# Patient Record
(demographics unavailable — no encounter records)

---

## 2024-11-25 NOTE — PHYSICAL EXAM
[No Acute Distress] : no acute distress [Well Nourished] : well nourished [Well Developed] : well developed [Well-Appearing] : well-appearing [Normal Sclera/Conjunctiva] : normal sclera/conjunctiva [PERRL] : pupils equal round and reactive to light [EOMI] : extraocular movements intact [Normal Outer Ear/Nose] : the outer ears and nose were normal in appearance [Normal Oropharynx] : the oropharynx was normal [No JVD] : no jugular venous distention [No Lymphadenopathy] : no lymphadenopathy [Supple] : supple [Thyroid Normal, No Nodules] : the thyroid was normal and there were no nodules present [No Respiratory Distress] : no respiratory distress  [No Accessory Muscle Use] : no accessory muscle use [Clear to Auscultation] : lungs were clear to auscultation bilaterally [Normal Rate] : normal rate  [Regular Rhythm] : with a regular rhythm [Normal S1, S2] : normal S1 and S2 [No Murmur] : no murmur heard [No Carotid Bruits] : no carotid bruits [No Abdominal Bruit] : a ~M bruit was not heard ~T in the abdomen [No Varicosities] : no varicosities [Pedal Pulses Present] : the pedal pulses are present [No Edema] : there was no peripheral edema [No Palpable Aorta] : no palpable aorta [No Extremity Clubbing/Cyanosis] : no extremity clubbing/cyanosis [Soft] : abdomen soft [Non Tender] : non-tender [Non-distended] : non-distended [No Masses] : no abdominal mass palpated [No HSM] : no HSM [Normal Bowel Sounds] : normal bowel sounds [Normal Posterior Cervical Nodes] : no posterior cervical lymphadenopathy [Normal Anterior Cervical Nodes] : no anterior cervical lymphadenopathy [No CVA Tenderness] : no CVA  tenderness [No Spinal Tenderness] : no spinal tenderness [Scoliosis] : scoliosis [No Joint Swelling] : no joint swelling [Grossly Normal Strength/Tone] : grossly normal strength/tone [No Rash] : no rash [Coordination Grossly Intact] : coordination grossly intact [No Focal Deficits] : no focal deficits [Deep Tendon Reflexes (DTR)] : deep tendon reflexes were 2+ and symmetric [Normal Affect] : the affect was normal [Normal Insight/Judgement] : insight and judgment were intact [de-identified] : IN DISTRESS- 9/10 pain on right side, sitting hunched over [de-identified] : no CVAT; does have mod scoiosis; poor ROM, nontender to palp [de-identified] : Pt is hunched over to the right holding her side [de-identified] : diff walking, using cane, unable to get up onto table

## 2024-11-25 NOTE — HISTORY OF PRESENT ILLNESS
[FreeTextEntry8] : Pt was here for annual but is in great pain and we switched to acute visit: Cares for mom who is 100 yo. While helping her off toilet, pt pulled something on right side a few dd ago, now in agony. She thinks it's the "latissimus" mm. She is having trouble walking. Not driving. Some radiation down right side into leg. ?dysuria, pain sort of starts at flank. No fever/chills, n/v. No blood in urine. She vomited from the pain. Does not take meds eg tylenol, advil. Has had this before but this is much worse. She is always in wierd positions helping her mom who's in [facility] home. A few wks ago spent 4 dd in [facility] for vertigo- many tests done, all neg per pt and referred to neuro and cardiol. Will request records. Last wk she saw Cardiol at [facility] Dr Olivo, hydralazine was stopped and he restarted it- altho no mes taken today Mammo/Dexa due Flu shot: will do today   h/o hip fx, also left knee repl. Dexa nl tho and not on meds.

## 2024-11-25 NOTE — ASSESSMENT
[FreeTextEntry1] : u/a sm leuk  Imp: pt w acute right side/back pain ?beginning in flank area although she feels it's a muscle strain from carrying her mother.  Will check Abdo US, urine cx Flu shot now Get records from recent hosp for vertigo.  Monitor BP at home, she will send readings, she didn't take her meds today..  Advised tylenol, heat to area. Refer to Ortho, she has scoliosis too.  ENT per request f/u within 2 mos

## 2025-01-16 NOTE — HISTORY OF PRESENT ILLNESS
[Chronic Kidney Disease] : chronic kidney disease [Moderate (4-6 METs)] : Moderate (4-6 METs) [(Patient denies any chest pain, claudication, dyspnea on exertion, orthopnea, palpitations or syncope)] : Patient denies any chest pain, claudication, dyspnea on exertion, orthopnea, palpitations or syncope [Aortic Stenosis] : no aortic stenosis [Atrial Fibrillation] : no atrial fibrillation [Coronary Artery Disease] : no coronary artery disease [Recent Myocardial Infarction] : no recent myocardial infarction [Implantable Device/Pacemaker] : no implantable device/pacemaker [Asthma] : no asthma [COPD] : no COPD [Sleep Apnea] : no sleep apnea [Smoker] : not a smoker [Family Member] : no family member with adverse anesthesia reaction/sudden death [Self] : no previous adverse anesthesia reaction [Chronic Anticoagulation] : no chronic anticoagulation [Diabetes] : no diabetes [FreeTextEntry1] : Left eye cataract surgery [FreeTextEntry2] : 1.29.25 [FreeTextEntry3] : Dr. Kota Lugo  [FreeTextEntry4] : 79 year old female with PMH CKD, HTN and HLD presents for pre-op evaluation.

## 2025-01-16 NOTE — PHYSICAL EXAM
[Well-Appearing] : well-appearing [Normal] : no carotid or abdominal bruits heard, no varicosities, pedal pulses are present, no peripheral edema, no extremity clubbing or cyanosis and no palpable aorta

## 2025-01-23 NOTE — HISTORY OF PRESENT ILLNESS
[de-identified] : 80 yo woman w Htn, obesity, HLD, CKD, DJD/TKR/hip fx, ankle fx, osteopenia  Gerd, atyp CP, MVP, LBP  Statin intolerance, not on  PCSK-9- Takes fish oil. Was given Metopr succinate, had tartrate at home and feels better on that, would like new script. Home BPs ~ 136/80 Meds: metopr bid, hydralazine 10 bid, ramipril 5  6/24 D & C, for endomet thickening, endmterial polyp removal No bleeding Saw Ortho spine Dr. Sutton, ordered for PT but didn't go yet. Nephrol has seen for lowering of GFR urine alb ok, not yet candidate for Farxiga.  Cardiol: goes now to [facility], see notes, last there 5/23  Mammo/US/Dexa are due  Abdo US last wk neg, done for ?flank pain. notd at cataract clearance visit.  [facility] a [facility] at senior center;  states she  falls often, ?due to leg length discrepancy-- 3 mos ago fell down flight of stairs, hit head, was ok and did not prusue ER etc.  Prevnar: she thinks she had update in hosp last yr Tdap: due, will do at pharm  [facility] '16 Dr. Reynaga, told 10 yrs pt would like new GI sooner scope.

## 2025-01-23 NOTE — HEALTH RISK ASSESSMENT
[Good] : ~his/her~  mood as  good [No] : No [One fall no injury in past year] : Patient reported one fall in the past year without injury [0] : 2) Feeling down, depressed, or hopeless: Not at all (0) [PHQ-2 Negative - No further assessment needed] : PHQ-2 Negative - No further assessment needed [Never] : Never [de-identified] : daily, at [facility] center [de-identified] : ok [de-identified] : leg length discrepancy [de-identified] : Fall risk [[id number]] : 0 [Patient reported colonoscopy was normal] : Patient reported colonoscopy was normal [Change in mental status noted] : No change in mental status noted [Language] : denies difficulty with language [None] : None [Alone] : lives alone [Retired] : retired [Single] : single [Sexually Active] : not sexually active [High Risk Behavior] : no high risk behavior [Feels Safe at Home] : Feels safe at home [Fully functional (bathing, dressing, toileting, transferring, walking, feeding)] : Fully functional (bathing, dressing, toileting, transferring, walking, feeding) [Fully functional (using the telephone, shopping, preparing meals, housekeeping, doing laundry, using] : Fully functional and needs no help or supervision to perform IADLs (using the telephone, shopping, preparing meals, housekeeping, doing laundry, using transportation, managing medications and managing finances) [Reports changes in hearing] : Reports no changes in hearing [Reports changes in vision] : Reports changes in vision [Reports changes in dental health] : Reports no changes in dental health [Smoke Detector] : smoke detector [Carbon Monoxide Detector] : carbon monoxide detector [Safety elements used in home] : safety elements used in home [Seat Belt] :  uses seat belt [MammogramDate] : 08/20 [BoneDensityDate] : 08/21 [ColonoscopyDate] : 2016 [ColonoscopyComments] : polyp [de-identified] : cat [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

## 2025-01-23 NOTE — ASSESSMENT
[FreeTextEntry1] : Pt as outlined I am concerned about her fall risk, she says she "falls often" , had fracture in the past not lately. She was amenable to PT for gait training, STARS. Mammo/US/Dexa Tdap at [facility] last yr during hosp- will check if able check labs when fasting. refer to [facility] as pt wants sooner c-scope.  Cataract surg next wk

## 2025-01-23 NOTE — ASSESSMENT
Problem: Physical Therapy Goal  Goal: Physical Therapy Goal  Goals to be met by: 17     Patient will increase functional independence with mobility by performin. Supine to sit with Modified [place]  2. Sit to supine with Modified [place]  3. Sit to stand transfer with Modified [place]  4. Bed to chair transfer with Modified [place] using appropriate AD if needed  5. Gait x 150 feet with Modified [place] using appropriate AD if needed.  6. Ascend/descend 16 stairs with bilateral Handrails Supervision       Recommendations: Pt to benefit from continued PT intervention to improve independence with functional mobility/ADL. Recommend d/c home c/ family. Further d/c recs pending progress c/ therapy.    Outcome: Ongoing (interventions implemented as appropriate)  Goals 1 thur 4 almost met  Continue to work with PT for Mod I gait        [FreeTextEntry1] : Pt as outlined I am concerned about her fall risk, she says she "falls often" , had fracture in the past not lately. She was amenable to PT for gait training, STARS. Mammo/US/Dexa Tdap at [facility] last yr during hosp- will check if able check labs when fasting. refer to [facility] as pt wants sooner c-scope.  Cataract surg next wk

## 2025-01-23 NOTE — HISTORY OF PRESENT ILLNESS
[de-identified] : 78 yo woman w Htn, obesity, HLD, CKD, DJD/TKR/hip fx, ankle fx, osteopenia  Gerd, atyp CP, MVP, LBP  Statin intolerance, not on  PCSK-9- Takes fish oil. Was given Metopr succinate, had tartrate at home and feels better on that, would like new script. Home BPs ~ 136/80 Meds: metopr bid, hydralazine 10 bid, ramipril 5  6/24 D & C, for endomet thickening, endmterial polyp removal No bleeding Saw Ortho spine Dr. Sutton, ordered for PT but didn't go yet. Nephrol has seen for lowering of GFR urine alb ok, not yet candidate for Farxiga.  Cardiol: goes now to [facility], see notes, last there 5/23  Mammo/US/Dexa are due  Abdo US last wk neg, done for ?flank pain. notd at cataract clearance visit.  She exercises a lot at senior center;  states she  falls often, ?due to leg length discrepancy-- 3 mos ago fell down flight of stairs, hit head, was ok and did not prusue ER etc.  Prevnar: she thinks she had update in hosp last yr Tdap: due, will do at pharm  [place] '16 Dr. Reynaga, told 10 yrs pt would like new GI sooner scope.

## 2025-01-23 NOTE — HEALTH RISK ASSESSMENT
[Good] : ~his/her~  mood as  good [No] : No [One fall no injury in past year] : Patient reported one fall in the past year without injury [0] : 2) Feeling down, depressed, or hopeless: Not at all (0) [PHQ-2 Negative - No further assessment needed] : PHQ-2 Negative - No further assessment needed [Never] : Never [de-identified] : daily, at [facility] center [de-identified] : ok [de-identified] : leg length discrepancy [de-identified] : Fall risk [[id number]] : 0 [Patient reported colonoscopy was normal] : Patient reported colonoscopy was normal [Change in mental status noted] : No change in mental status noted [Language] : denies difficulty with language [None] : None [Alone] : lives alone [Retired] : retired [Single] : single [Sexually Active] : not sexually active [High Risk Behavior] : no high risk behavior [Feels Safe at Home] : Feels safe at home [Fully functional (bathing, dressing, toileting, transferring, walking, feeding)] : Fully functional (bathing, dressing, toileting, transferring, walking, feeding) [Fully functional (using the telephone, shopping, preparing meals, housekeeping, doing laundry, using] : Fully functional and needs no help or supervision to perform IADLs (using the telephone, shopping, preparing meals, housekeeping, doing laundry, using transportation, managing medications and managing finances) [Reports changes in hearing] : Reports no changes in hearing [Reports changes in vision] : Reports changes in vision [Reports changes in dental health] : Reports no changes in dental health [Smoke Detector] : smoke detector [Carbon Monoxide Detector] : carbon monoxide detector [Safety elements used in home] : safety elements used in home [Seat Belt] :  uses seat belt [MammogramDate] : 08/20 [BoneDensityDate] : 08/21 [ColonoscopyDate] : 2016 [ColonoscopyComments] : polyp [de-identified] : cat [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

## 2025-04-22 NOTE — HISTORY OF PRESENT ILLNESS
[FreeTextEntry1] : Ms. WINTERS is a 79 year old female with hx of HTN and HTN here for recently noted drop in GFR. 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 Keita.  Left hip fx last yr- while rushing down slippery steps in [place]. 2/21 left Knee replacement  s/p Covid vax x 2 Pfizer recently. She has had crt in 1 range for years since 2012 and more recently GFR in 58 range compared to 70s few years ago. She has been on HCTZ and ACEi for years as well. She had a recent ECHO that showed LVH and HTN changes and hence got a stress test that was not great due to HR rising. Her HCTZ was dc and started on metoprolol 25mg qd. SHe just took that med yesterday and today feels lightheaded and dizzy. She has also noticed decreased urine output. She has no foamy urine, no hematuria  Since last visit, was stable, GFR 50s in Jan 2023 In June 2024- no major changes. She was hospitalized six months ago for COVID19 in [facility] Otherwise, she feels well now BP high as missed her last evening bp med dose  April 2025- overall was doing well, last few months, felt she has less UO and more feeling of dryness.  She got new eye drops for dry eyes No n/v. No decrease in appetite, no termors. No edema worsening. no decreased sleep. No foamy urine. no hematuria, no dysuria. no confusion. No SOB, CHAUDHARY. No wt loss.

## 2025-04-22 NOTE — ASSESSMENT
[FreeTextEntry1] : Ms. WINTERS is a 79 year old female with slightly decreased eGFR may not be truly pathologic- but more physiologic and med related. She also likely has some chronic micro and macro vascular disease leading to early CKD. She has HTN for years as well. Her overall crt levels have been stable and GFR not that bad. Re-assured her.   HTN: eventually better to increase ACEi and come off hydralazine. Check labs today again to make sure all stable Pt asked re SGLT2i- not a candidate yet as no proteinuria CKD- Stage 3, stable overall, checking labs today with urinalysis and proteinuria Wt loss suggested  f/.u 5-6 months